# Patient Record
Sex: MALE | Race: OTHER | HISPANIC OR LATINO
[De-identification: names, ages, dates, MRNs, and addresses within clinical notes are randomized per-mention and may not be internally consistent; named-entity substitution may affect disease eponyms.]

---

## 2023-03-22 PROBLEM — Z00.00 ENCOUNTER FOR PREVENTIVE HEALTH EXAMINATION: Status: ACTIVE | Noted: 2023-03-22

## 2023-03-29 ENCOUNTER — NON-APPOINTMENT (OUTPATIENT)
Age: 73
End: 2023-03-29

## 2023-03-29 ENCOUNTER — APPOINTMENT (OUTPATIENT)
Dept: SURGICAL ONCOLOGY | Facility: CLINIC | Age: 73
End: 2023-03-29
Payer: MEDICARE

## 2023-03-29 VITALS
WEIGHT: 153 LBS | BODY MASS INDEX: 28.16 KG/M2 | DIASTOLIC BLOOD PRESSURE: 77 MMHG | HEART RATE: 68 BPM | RESPIRATION RATE: 16 BRPM | OXYGEN SATURATION: 98 % | HEIGHT: 62 IN | SYSTOLIC BLOOD PRESSURE: 143 MMHG

## 2023-03-29 PROCEDURE — 99204 OFFICE O/P NEW MOD 45 MIN: CPT

## 2023-03-29 NOTE — ADDENDUM
[FreeTextEntry1] : I, Janet Cheung, acted solely as a scribe for Dr. Benedict Beckett on this date 03/29/2023.\par

## 2023-03-29 NOTE — HISTORY OF PRESENT ILLNESS
[de-identified] : Patient is a 71 y/o male who presents an initial consultation for bilateral gynecomastia. He complains of enlarging left breast w/ swelling and discomfort for the past 4 months. \par \par Patient underwent bilateral mammogram and sonogram on 1/24/23 which showed no mammographic or sonographic evidence of malignancy. Bilateral gynecomastia, left greater than right. No suspicious solid or complex cystic mass detected. (BI-RADS 2)\par \par Denies any family history of breast cancer. \par Pt is only on cholesterol medication. \par

## 2023-03-29 NOTE — PHYSICAL EXAM
[Normal] : supple, no neck mass and thyroid not enlarged [Normal Neck Lymph Nodes] : normal neck lymph nodes  [Normal Supraclavicular Lymph Nodes] : normal supraclavicular lymph nodes [Normal Groin Lymph Nodes] : normal groin lymph nodes [Normal Axillary Lymph Nodes] : normal axillary lymph nodes [Normal] : oriented to person, place and time, with appropriate affect [de-identified] : tender left retroareolar mass c/w gynecomastia. right breast normal. no adenopathy.

## 2023-03-29 NOTE — CONSULT LETTER
[Dear  ___] : Dear  [unfilled], [Consult Letter:] : I had the pleasure of evaluating your patient, [unfilled]. [Please see my note below.] : Please see my note below. [Sincerely,] : Sincerely, [FreeTextEntry3] : Benedict Beckett MD FACS\par

## 2023-03-29 NOTE — ASSESSMENT
[FreeTextEntry1] : Enlarging left breast c/w gynecomastia \par BI-RADS 2 imaging January 2023 \par Reassured patient and family member that index of suspicion for malignancy is low\par Discussed surgical resection but pt wants to hold off \par RTO 3 months or prn if mass increases in size or if he decides to proceed with surgery

## 2023-05-26 ENCOUNTER — APPOINTMENT (OUTPATIENT)
Dept: SURGICAL ONCOLOGY | Facility: CLINIC | Age: 73
End: 2023-05-26

## 2023-05-31 NOTE — ADDENDUM
[FreeTextEntry1] : I, Janet Cheung, acted solely as a scribe for Dr. Benedict Beckett on this date 05/26/2023. \par

## 2023-05-31 NOTE — HISTORY OF PRESENT ILLNESS
[de-identified] : Patient is a 73 y/o male who presents a follow up  after an initial evaluation on 3/29/23 for bilateral gynecomastia. He complained of a 4 month history of enlarging left breast w/ swelling and discomfort.\par \par 6/7/23- He returns for a 3 month follow up physical exam. \par \par Patient underwent bilateral mammogram and sonogram on 1/24/23 which showed no mammographic or sonographic evidence of malignancy. Bilateral gynecomastia, left greater than right. No suspicious solid or complex cystic mass detected. (BI-RADS 2)\par \par Denies any family history of breast cancer. \par Pt is only on cholesterol medication. \par

## 2023-05-31 NOTE — ASSESSMENT
[FreeTextEntry1] : Enlarging left breast c/w gynecomastia \par BI-RADS 2 imaging January 2023 \par Reassured patient and family member that index of suspicion for malignancy is low\par Discussed surgical resection but pt wants to hold off \par

## 2023-05-31 NOTE — PHYSICAL EXAM
[de-identified] : tender left retroareolar mass c/w gynecomastia. right breast normal. no adenopathy.

## 2023-06-07 ENCOUNTER — APPOINTMENT (OUTPATIENT)
Dept: SURGICAL ONCOLOGY | Facility: CLINIC | Age: 73
End: 2023-06-07

## 2023-08-21 DIAGNOSIS — N63.25 UNSP LUMP IN THE LT BREAST, OVERLAPPING QUADRANTS: ICD-10-CM

## 2023-09-07 ENCOUNTER — OUTPATIENT (OUTPATIENT)
Dept: OUTPATIENT SERVICES | Facility: HOSPITAL | Age: 73
LOS: 1 days | End: 2023-09-07
Payer: MEDICARE

## 2023-09-07 VITALS
SYSTOLIC BLOOD PRESSURE: 121 MMHG | OXYGEN SATURATION: 99 % | DIASTOLIC BLOOD PRESSURE: 70 MMHG | HEIGHT: 63 IN | HEART RATE: 62 BPM | RESPIRATION RATE: 18 BRPM | WEIGHT: 141.98 LBS | TEMPERATURE: 98 F

## 2023-09-07 DIAGNOSIS — S68.119A COMPLETE TRAUMATIC METACARPOPHALANGEAL AMPUTATION OF UNSPECIFIED FINGER, INITIAL ENCOUNTER: Chronic | ICD-10-CM

## 2023-09-07 DIAGNOSIS — N63.25 UNSPECIFIED LUMP IN THE LEFT BREAST, OVERLAPPING QUADRANTS: ICD-10-CM

## 2023-09-07 LAB
ANION GAP SERPL CALC-SCNC: 13 MMOL/L — SIGNIFICANT CHANGE UP (ref 7–14)
BUN SERPL-MCNC: 15 MG/DL — SIGNIFICANT CHANGE UP (ref 7–23)
CALCIUM SERPL-MCNC: 9.1 MG/DL — SIGNIFICANT CHANGE UP (ref 8.4–10.5)
CHLORIDE SERPL-SCNC: 105 MMOL/L — SIGNIFICANT CHANGE UP (ref 98–107)
CO2 SERPL-SCNC: 24 MMOL/L — SIGNIFICANT CHANGE UP (ref 22–31)
CREAT SERPL-MCNC: 0.8 MG/DL — SIGNIFICANT CHANGE UP (ref 0.5–1.3)
EGFR: 95 ML/MIN/1.73M2 — SIGNIFICANT CHANGE UP
GLUCOSE SERPL-MCNC: 80 MG/DL — SIGNIFICANT CHANGE UP (ref 70–99)
HCT VFR BLD CALC: 38.1 % — LOW (ref 39–50)
HGB BLD-MCNC: 12.9 G/DL — LOW (ref 13–17)
MCHC RBC-ENTMCNC: 30.2 PG — SIGNIFICANT CHANGE UP (ref 27–34)
MCHC RBC-ENTMCNC: 33.9 GM/DL — SIGNIFICANT CHANGE UP (ref 32–36)
MCV RBC AUTO: 89.2 FL — SIGNIFICANT CHANGE UP (ref 80–100)
NRBC # BLD: 0 /100 WBCS — SIGNIFICANT CHANGE UP (ref 0–0)
NRBC # FLD: 0 K/UL — SIGNIFICANT CHANGE UP (ref 0–0)
PLATELET # BLD AUTO: 228 K/UL — SIGNIFICANT CHANGE UP (ref 150–400)
POTASSIUM SERPL-MCNC: 3.7 MMOL/L — SIGNIFICANT CHANGE UP (ref 3.5–5.3)
POTASSIUM SERPL-SCNC: 3.7 MMOL/L — SIGNIFICANT CHANGE UP (ref 3.5–5.3)
RBC # BLD: 4.27 M/UL — SIGNIFICANT CHANGE UP (ref 4.2–5.8)
RBC # FLD: 12.1 % — SIGNIFICANT CHANGE UP (ref 10.3–14.5)
SODIUM SERPL-SCNC: 142 MMOL/L — SIGNIFICANT CHANGE UP (ref 135–145)
WBC # BLD: 5.47 K/UL — SIGNIFICANT CHANGE UP (ref 3.8–10.5)
WBC # FLD AUTO: 5.47 K/UL — SIGNIFICANT CHANGE UP (ref 3.8–10.5)

## 2023-09-07 PROCEDURE — 93010 ELECTROCARDIOGRAM REPORT: CPT

## 2023-09-07 NOTE — H&P PST ADULT - HISTORY OF PRESENT ILLNESS
74y/o Danish speaking male presents for preop eval for scheduled resection left breast mass.  Pt with c/o left breast mass and painful to touch appprox six months ago.  Mass has gradually increased in size.

## 2023-09-07 NOTE — H&P PST ADULT - PROBLEM SELECTOR PLAN 1
Scheduled for resection left breast mass  Written & verbal preop instructions, gi prophylaxis & surgical soap given  Pt verbalized good understanding.  Teach back done on surgical soap instructions.  recent cardiology eval done, pending copy of report & most recent cardiology studies

## 2023-09-07 NOTE — H&P PST ADULT - NSANTHOSAYNRD_GEN_A_CORE
No. CAROLYNE screening performed.  STOP BANG Legend: 0-2 = LOW Risk; 3-4 = INTERMEDIATE Risk; 5-8 = HIGH Risk

## 2023-09-08 VITALS
DIASTOLIC BLOOD PRESSURE: 70 MMHG | TEMPERATURE: 98 F | HEIGHT: 63 IN | RESPIRATION RATE: 18 BRPM | SYSTOLIC BLOOD PRESSURE: 130 MMHG | WEIGHT: 141.98 LBS | HEART RATE: 54 BPM | OXYGEN SATURATION: 98 %

## 2023-09-08 NOTE — ASU PREOPERATIVE ASSESSMENT, ADULT ([facility]ARK ONLY) - FALL HARM RISK - UNIVERSAL INTERVENTIONS
Bed in lowest position, wheels locked, appropriate side rails in place/Call bell, personal items and telephone in reach/Instruct patient to call for assistance before getting out of bed or chair/Non-slip footwear when patient is out of bed/[place] to call system/Physically safe environment - no spills, clutter or unnecessary equipment/Purposeful Proactive Rounding/Room/bathroom lighting operational, light cord in reach

## 2023-09-10 ENCOUNTER — TRANSCRIPTION ENCOUNTER (OUTPATIENT)
Age: 73
End: 2023-09-10

## 2023-09-11 ENCOUNTER — OUTPATIENT (OUTPATIENT)
Dept: OUTPATIENT SERVICES | Facility: HOSPITAL | Age: 73
LOS: 1 days | Discharge: HOME | End: 2023-09-11
Payer: MEDICARE

## 2023-09-11 ENCOUNTER — RESULT REVIEW (OUTPATIENT)
Age: 73
End: 2023-09-11

## 2023-09-11 ENCOUNTER — TRANSCRIPTION ENCOUNTER (OUTPATIENT)
Age: 73
End: 2023-09-11

## 2023-09-11 ENCOUNTER — APPOINTMENT (OUTPATIENT)
Dept: SURGICAL ONCOLOGY | Facility: AMBULATORY SURGERY CENTER | Age: 73
End: 2023-09-11

## 2023-09-11 VITALS
SYSTOLIC BLOOD PRESSURE: 118 MMHG | RESPIRATION RATE: 15 BRPM | HEART RATE: 60 BPM | OXYGEN SATURATION: 99 % | DIASTOLIC BLOOD PRESSURE: 61 MMHG | TEMPERATURE: 97 F

## 2023-09-11 DIAGNOSIS — S68.119A COMPLETE TRAUMATIC METACARPOPHALANGEAL AMPUTATION OF UNSPECIFIED FINGER, INITIAL ENCOUNTER: Chronic | ICD-10-CM

## 2023-09-11 DIAGNOSIS — N63.25 UNSPECIFIED LUMP IN THE LEFT BREAST, OVERLAPPING QUADRANTS: ICD-10-CM

## 2023-09-11 PROCEDURE — 19300 MASTECTOMY FOR GYNECOMASTIA: CPT

## 2023-09-11 PROCEDURE — 88307 TISSUE EXAM BY PATHOLOGIST: CPT | Mod: 26

## 2023-09-11 DEVICE — ARISTA 3GR: Type: IMPLANTABLE DEVICE | Status: FUNCTIONAL

## 2023-09-11 RX ORDER — OXYCODONE HYDROCHLORIDE 5 MG/1
5 TABLET ORAL EVERY 6 HOURS
Refills: 0 | Status: DISCONTINUED | OUTPATIENT
Start: 2023-09-11 | End: 2023-09-11

## 2023-09-11 RX ORDER — SIMVASTATIN 20 MG/1
1 TABLET, FILM COATED ORAL
Refills: 0 | DISCHARGE

## 2023-09-11 RX ORDER — OXYCODONE HYDROCHLORIDE 5 MG/1
1 TABLET ORAL
Qty: 10 | Refills: 0
Start: 2023-09-11

## 2023-09-11 RX ORDER — ACETAMINOPHEN 500 MG
975 TABLET ORAL EVERY 6 HOURS
Refills: 0 | Status: DISCONTINUED | OUTPATIENT
Start: 2023-09-11 | End: 2023-09-25

## 2023-09-11 NOTE — PACU DISCHARGE NOTE - COMMENTS
T(C): 36.2 (09-11-23 @ 10:44), Max: 36.5 (09-11-23 @ 08:21)  HR: 60 (09-11-23 @ 11:29) (54 - 63)  BP: 118/61 (09-11-23 @ 11:29) (114/63 - 130/70)  RR: 15 (09-11-23 @ 11:29) (14 - 20)  SpO2: 99% (09-11-23 @ 11:29) (97% - 99%)    No apparent anesthesia complications. Vital signs stable, non-labored breathing with adequate oxygen saturation on room air. Pain and nausea are controlled. All questions answered. Stable for discharge home with an escort.

## 2023-09-11 NOTE — ASU DISCHARGE PLAN (ADULT/PEDIATRIC) - NS MD DC FALL RISK RISK
For information on Fall & Injury Prevention, visit: [email or web address] OR  [email or web address] OR  https://www.cdc.gov/steadi/patient.html

## 2023-09-11 NOTE — ASU DISCHARGE PLAN (ADULT/PEDIATRIC) - CARE PROVIDER_API CALL
Benedict Beckett [facility]  [address] [place] [id number]  Phone: [phone]  Fax: [phone]  Established Patient  Follow Up Time: 1 week

## 2023-09-11 NOTE — BRIEF OPERATIVE NOTE - SPECIMENS
----- Message from Martha Fleming MD sent at 12/5/2018  5:06 PM CST -----  Call patient. Urine culture reveals multiple organisms with no predominant type, consistent with possible contamination. I would like her to take 3 days of antibiotics as prescribed, and submit another specimen for urine culture after she completes the antibiotics. Left breast mass

## 2023-09-11 NOTE — ASU DISCHARGE PLAN (ADULT/PEDIATRIC) - ASU DC SPECIAL INSTRUCTIONSFT
Follow Instructions Provided by your Surgical Team  NOTIFY YOUR SURGEON IF YOU HAVE: any bleeding that does not stop, any pus draining from your wound(s), any fever (over 100.4 F) persistent nausea/vomiting, or if your pain is not controlled on your discharge pain medications, unable to urinate.    ACTIVITY: Please refrain from increased physical activity for a period of 2 weeks. Please do not lift anything heavier than a gallon of milk or about 5 pounds. Upon follow up you will be given further instructions on how much physical activity you may do.     DRAINS: You will be discharged with GINGER drains. You will need to empty them and record outputs accurately. This will be taught to you by the nursing staff. Please do not remove the GINGER drains. They will be removed in the office. Please bring to the office accurate records of output.     Please take 1000mg Tylenol every 6 hours as needed. Please do not exceed 4000mg Tylenol in any 24 hour period. You are being prescribed 5mg oxycodone tablets. Please take 1 every 6 hours as needed for severe post surgical breakthrough pain not controlled by other medications. This prescription has been sent to your specified pharmacy.     Please keep breast dry, drain sites dry. Please avoid raising arms or reaching outward for things.     Please follow up with Dr. Beckett within x1 week after discharge from the hospital.   At the time of discharge, the patient was hemodynamically stable, was tolerating PO diet, was voiding urine, was ambulating, and was comfortable with adequate pain control.     Siga las instrucciones proporcionadas por abarca equipo quirúrgico  NOTIFIQUE A ABARCA CIRUJANO SI TIENE: cualquier sangrado que no se detiene, pus que sale de abarca(s) herida(s), fiebre (más de 100.4 F), náuseas/vómitos persistentes, o si abarca dolor no se controla con los analgésicos que le dan de radha, no puede orinar.    ACTIVIDAD: Abstenerse de realizar mayor actividad física justina un período de 2 semanas. No levante nada que pese más de un galón de leche o aproximadamente 5 libras. Tras el seguimiento, se le darán más instrucciones sobre cuánta actividad física puede realizar.    DRENAJES: Se le dará de radha con drenajes GINGER. Deberá vaciarlos y registrar los resultados con precisión. [place] se lo enseñará el personal de enfermería. No retire los drenajes GINGER. Se retirarán en la oficina. Por favor traiga a la oficina registros precisos de los resultados.    [place] 1000 mg de Tylenol cada 6 horas según sea necesario. No exceda los 4000 mg de Tylenol en ningún período de 24 horas. Le recetan tabletas de oxicodona de 5 mg. [place] 1 cada 6 horas según sea necesario para el dolor irruptivo posquirúrgico intenso que no se controla con otros medicamentos. Esta receta ha sido enviada a abarca farmacia especificada.    Por favor, evite levantar los brazos o estirar la mano para coger cosas.    Realice un seguimiento con el Dr. Beckett dentro de james semana después del radha del hospital.  En el momento del radha, el paciente estaba hemodinámicamente estable, toleraba la dieta PO, orinaba, deambulaba y se sentía cómodo con un control adecuado del dolor.

## 2023-09-14 LAB — SURGICAL PATHOLOGY STUDY: SIGNIFICANT CHANGE UP

## 2023-09-20 PROBLEM — N63.25 UNSPECIFIED LUMP IN THE LEFT BREAST, OVERLAPPING QUADRANTS: Chronic | Status: ACTIVE | Noted: 2023-09-07

## 2023-09-22 ENCOUNTER — APPOINTMENT (OUTPATIENT)
Dept: SURGICAL ONCOLOGY | Facility: CLINIC | Age: 73
End: 2023-09-22
Payer: MEDICARE

## 2023-09-22 VITALS
OXYGEN SATURATION: 98 % | DIASTOLIC BLOOD PRESSURE: 73 MMHG | BODY MASS INDEX: 25.34 KG/M2 | RESPIRATION RATE: 17 BRPM | WEIGHT: 143 LBS | HEART RATE: 78 BPM | SYSTOLIC BLOOD PRESSURE: 112 MMHG | HEIGHT: 63 IN

## 2023-09-22 PROCEDURE — 99024 POSTOP FOLLOW-UP VISIT: CPT

## 2023-11-01 ENCOUNTER — APPOINTMENT (OUTPATIENT)
Dept: SURGICAL ONCOLOGY | Facility: CLINIC | Age: 73
End: 2023-11-01
Payer: MEDICARE

## 2023-11-01 VITALS
HEIGHT: 63 IN | HEART RATE: 65 BPM | DIASTOLIC BLOOD PRESSURE: 69 MMHG | OXYGEN SATURATION: 98 % | WEIGHT: 143 LBS | SYSTOLIC BLOOD PRESSURE: 139 MMHG | BODY MASS INDEX: 25.34 KG/M2

## 2023-11-01 PROCEDURE — 99214 OFFICE O/P EST MOD 30 MIN: CPT | Mod: 24

## 2024-04-17 ENCOUNTER — APPOINTMENT (OUTPATIENT)
Dept: SURGICAL ONCOLOGY | Facility: CLINIC | Age: 74
End: 2024-04-17
Payer: MEDICARE

## 2024-04-17 VITALS
OXYGEN SATURATION: 98 % | BODY MASS INDEX: 26.22 KG/M2 | HEART RATE: 67 BPM | HEIGHT: 63 IN | SYSTOLIC BLOOD PRESSURE: 126 MMHG | RESPIRATION RATE: 17 BRPM | DIASTOLIC BLOOD PRESSURE: 71 MMHG | WEIGHT: 148 LBS

## 2024-04-17 DIAGNOSIS — N62 HYPERTROPHY OF BREAST: ICD-10-CM

## 2024-04-17 PROCEDURE — 99214 OFFICE O/P EST MOD 30 MIN: CPT

## 2024-04-17 NOTE — PHYSICAL EXAM
[Normal] : supple, no neck mass and thyroid not enlarged [Normal Neck Lymph Nodes] : normal neck lymph nodes  [Normal Supraclavicular Lymph Nodes] : normal supraclavicular lymph nodes [Normal Groin Lymph Nodes] : normal groin lymph nodes [Normal Axillary Lymph Nodes] : normal axillary lymph nodes [Normal] : oriented to person, place and time, with appropriate affect [de-identified] : Left areolar scar well healed.  R breast fullness and tenderness consistent with gynecomastia.  No suspicious masses bilaterally.

## 2024-04-17 NOTE — ADDENDUM
[FreeTextEntry1] : I, Janet Cheung, acted solely as a scribe for Dr. Benedict Beckett on this date 04/17/2024.

## 2024-04-17 NOTE — HISTORY OF PRESENT ILLNESS
[de-identified] : Patient is a 74 y/o male who presents a follow up for bilateral gynecomastia. S/p left excision on 9/11/23.  Today he presents with right-sided gynecomastia more prominent now and of chest wall tenderness. H did not take vitamin e. He drinks a lot of caffeinated beverages. Pt has not had any recent imaging.   Final pathology (9/11/23): 1. Breast, left retroareolar, excision: - Gynecomastia  Patient underwent bilateral mammogram and sonogram on 1/24/23 which showed no mammographic or sonographic evidence of malignancy. Bilateral gynecomastia, left greater than right. No suspicious solid or complex cystic mass detected. (BI-RADS 2)  Denies any family history of breast cancer.  Pt is only on cholesterol medication.

## 2024-04-17 NOTE — ASSESSMENT
[FreeTextEntry1] : S/p surgical resection left breast - c/w gynecomastia Now with symptomatic right breast gynecomastia  Patient does not want surgery at this time Recommend vitamin e and/or primrose prn  Will get mammogram/sonogram now RTO 6 months for exam

## 2024-11-20 ENCOUNTER — APPOINTMENT (OUTPATIENT)
Dept: SURGICAL ONCOLOGY | Facility: CLINIC | Age: 74
End: 2024-11-20

## (undated) DEVICE — DRAIN RESERVOIR FOR JACKSON PRATT 100CC CARDINAL

## (undated) DEVICE — DRSG MAMMARY SUPPORT MED SIZE 2

## (undated) DEVICE — ELCTR BOVIE TIP BLADE INSULATED 4" EDGE

## (undated) DEVICE — GOWN LG

## (undated) DEVICE — ELCTR ROCKER SWITCH PENCIL BLUE 10FT

## (undated) DEVICE — SOL IRR POUR NS 0.9% 500ML

## (undated) DEVICE — DRSG MAMMARY SUPPORT MED SIZE 3

## (undated) DEVICE — DRAPE TOWEL BLUE 17" X 24"

## (undated) DEVICE — WARMING BLANKET LOWER ADULT

## (undated) DEVICE — DRSG MAMMARY SUPPORT XL SIZE 5

## (undated) DEVICE — VENODYNE/SCD SLEEVE CALF MEDIUM

## (undated) DEVICE — SUT MONOCRYL 4-0 27" PS-2 UNDYED

## (undated) DEVICE — SUT PLAIN GUT FAST ABSORBING 5-0 PC-1

## (undated) DEVICE — GLV 7.5 PROTEXIS (WHITE)

## (undated) DEVICE — ELCTR GROUNDING PAD ADULT COVIDIEN

## (undated) DEVICE — GLV 7 PROTEXIS (WHITE)

## (undated) DEVICE — PACK MINOR NO DRAPE

## (undated) DEVICE — DRSG MAMMARY SUPPORT SM SIZE 1

## (undated) DEVICE — SOL IRR POUR H2O 500ML

## (undated) DEVICE — SUCTION YANKAUER NO CONTROL VENT

## (undated) DEVICE — DRAPE 3/4 SHEET 52X76"

## (undated) DEVICE — DRAPE INSTRUMENT POUCH 6.75" X 11"

## (undated) DEVICE — DRSG TEGADERM 4X4.75"

## (undated) DEVICE — RADIOGRAPHY DVC SPEC TRANSPEC

## (undated) DEVICE — POSITIONER PATIENT SAFETY STRAP 3X60"

## (undated) DEVICE — DRAPE CHEST BREAST 106" X 122"

## (undated) DEVICE — DRSG STERISTRIPS 0.5 X 4"

## (undated) DEVICE — DRSG MAMMARY SUPPORT LG SIZE 4

## (undated) DEVICE — LAP PAD W RING 18 X 18"

## (undated) DEVICE — NDL HYPO SAFE 25G X 1" (ORANGE)

## (undated) DEVICE — POSITIONER FOAM EGG CRATE ULNAR 2PCS (PINK)

## (undated) DEVICE — SUT MONOCRYL 3-0 18" PS-2 UNDYED

## (undated) DEVICE — DRAIN JACKSON PRATT 7MM FLAT FULL NO TROCAR

## (undated) DEVICE — SUT SILK 2-0 18" FS